# Patient Record
Sex: MALE | Race: BLACK OR AFRICAN AMERICAN | Employment: FULL TIME | URBAN - METROPOLITAN AREA
[De-identification: names, ages, dates, MRNs, and addresses within clinical notes are randomized per-mention and may not be internally consistent; named-entity substitution may affect disease eponyms.]

---
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
## 2019-02-02 ENCOUNTER — APPOINTMENT (OUTPATIENT)
Dept: GENERAL RADIOLOGY | Age: 19
End: 2019-02-02
Payer: COMMERCIAL

## 2019-02-02 ENCOUNTER — HOSPITAL ENCOUNTER (EMERGENCY)
Age: 19
Discharge: HOME | End: 2019-02-02
Attending: EMERGENCY MEDICINE
Payer: COMMERCIAL

## 2019-02-02 VITALS
RESPIRATION RATE: 16 BRPM | SYSTOLIC BLOOD PRESSURE: 124 MMHG | TEMPERATURE: 98.6 F | HEIGHT: 72 IN | HEART RATE: 74 BPM | WEIGHT: 161 LBS | OXYGEN SATURATION: 100 % | DIASTOLIC BLOOD PRESSURE: 77 MMHG | BODY MASS INDEX: 21.81 KG/M2
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
## 2019-02-02 DIAGNOSIS — S40.011A CONTUSION OF RIGHT SCAPULA, INITIAL ENCOUNTER: Primary | ICD-10-CM

## 2019-02-02 PROCEDURE — 99284 EMERGENCY DEPT VISIT MOD MDM: CPT

## 2019-02-02 PROCEDURE — 73010 X-RAY EXAM OF SHOULDER BLADE: CPT

## 2019-02-02 RX ORDER — CYCLOBENZAPRINE HCL 10 MG
10 TABLET ORAL 3 TIMES DAILY PRN
Qty: 9 TABLET | Refills: 0 | Status: SHIPPED | OUTPATIENT
Start: 2019-02-02 | End: 2019-02-05

## 2019-02-02 RX ORDER — NAPROXEN 500 MG/1
500 TABLET ORAL 2 TIMES DAILY WITH MEALS
Qty: 30 TABLET | Refills: 0 | Status: SHIPPED | OUTPATIENT
Start: 2019-02-02

## 2019-02-02 ASSESSMENT — ENCOUNTER SYMPTOMS
NAUSEA: 0
COLOR CHANGE: 0
SHORTNESS OF BREATH: 0
ABDOMINAL PAIN: 0
BACK PAIN: 1
VOMITING: 0

## 2019-02-02 ASSESSMENT — PAIN DESCRIPTION - LOCATION: LOCATION: SHOULDER

## 2019-02-02 ASSESSMENT — PAIN DESCRIPTION - PAIN TYPE: TYPE: ACUTE PAIN

## 2019-02-02 ASSESSMENT — PAIN DESCRIPTION - PROGRESSION: CLINICAL_PROGRESSION: GRADUALLY WORSENING

## 2019-02-02 ASSESSMENT — PAIN DESCRIPTION - ONSET: ONSET: SUDDEN

## 2019-02-02 ASSESSMENT — PAIN DESCRIPTION - DESCRIPTORS: DESCRIPTORS: ACHING;DISCOMFORT

## 2019-02-02 ASSESSMENT — PAIN DESCRIPTION - ORIENTATION: ORIENTATION: RIGHT

## 2019-02-02 ASSESSMENT — PAIN SCALES - GENERAL: PAINLEVEL_OUTOF10: 7

## 2020-07-13 ENCOUNTER — APPOINTMENT (OUTPATIENT)
Dept: GENERAL RADIOLOGY | Age: 20
End: 2020-07-13
Payer: COMMERCIAL

## 2020-07-13 ENCOUNTER — HOSPITAL ENCOUNTER (EMERGENCY)
Age: 20
Discharge: HOME | End: 2020-07-13
Attending: EMERGENCY MEDICINE
Payer: COMMERCIAL

## 2020-07-13 VITALS
RESPIRATION RATE: 14 BRPM | OXYGEN SATURATION: 100 % | SYSTOLIC BLOOD PRESSURE: 130 MMHG | DIASTOLIC BLOOD PRESSURE: 69 MMHG | HEIGHT: 74 IN | WEIGHT: 214 LBS | BODY MASS INDEX: 27.46 KG/M2 | TEMPERATURE: 98.5 F | HEART RATE: 88 BPM

## 2020-07-13 PROCEDURE — 99283 EMERGENCY DEPT VISIT LOW MDM: CPT

## 2020-07-13 PROCEDURE — 73130 X-RAY EXAM OF HAND: CPT

## 2020-07-13 PROCEDURE — 6370000000 HC RX 637 (ALT 250 FOR IP): Performed by: EMERGENCY MEDICINE

## 2020-07-13 PROCEDURE — 12001 RPR S/N/AX/GEN/TRNK 2.5CM/<: CPT

## 2020-07-13 RX ORDER — IBUPROFEN 800 MG/1
800 TABLET ORAL EVERY 8 HOURS PRN
Qty: 20 TABLET | Refills: 0 | Status: SHIPPED | OUTPATIENT
Start: 2020-07-13 | End: 2020-07-23

## 2020-07-13 RX ORDER — HYDROCODONE BITARTRATE AND ACETAMINOPHEN 5; 325 MG/1; MG/1
1 TABLET ORAL EVERY 6 HOURS PRN
Qty: 10 TABLET | Refills: 0 | Status: SHIPPED | OUTPATIENT
Start: 2020-07-13 | End: 2020-07-16

## 2020-07-13 RX ORDER — CEPHALEXIN 500 MG/1
500 CAPSULE ORAL 3 TIMES DAILY
Qty: 15 CAPSULE | Refills: 0 | Status: SHIPPED | OUTPATIENT
Start: 2020-07-13 | End: 2020-07-18

## 2020-07-13 RX ORDER — HYDROCODONE BITARTRATE AND ACETAMINOPHEN 5; 325 MG/1; MG/1
1 TABLET ORAL ONCE
Status: COMPLETED | OUTPATIENT
Start: 2020-07-13 | End: 2020-07-13

## 2020-07-13 RX ORDER — CEPHALEXIN 500 MG/1
500 CAPSULE ORAL ONCE
Status: COMPLETED | OUTPATIENT
Start: 2020-07-13 | End: 2020-07-13

## 2020-07-13 RX ADMIN — HYDROCODONE BITARTRATE AND ACETAMINOPHEN 1 TABLET: 5; 325 TABLET ORAL at 20:19

## 2020-07-13 RX ADMIN — CEPHALEXIN 500 MG: 500 CAPSULE ORAL at 20:19

## 2020-07-13 ASSESSMENT — PAIN SCALES - GENERAL
PAINLEVEL_OUTOF10: 5
PAINLEVEL_OUTOF10: 5

## 2020-07-13 ASSESSMENT — PAIN DESCRIPTION - DESCRIPTORS: DESCRIPTORS: PATIENT UNABLE TO DESCRIBE

## 2020-07-13 ASSESSMENT — PAIN DESCRIPTION - ORIENTATION: ORIENTATION: RIGHT

## 2020-07-13 ASSESSMENT — PAIN DESCRIPTION - PAIN TYPE: TYPE: ACUTE PAIN

## 2020-07-13 ASSESSMENT — PAIN DESCRIPTION - LOCATION: LOCATION: OTHER (COMMENT)

## 2020-07-13 NOTE — ED PROVIDER NOTES
[facility] EMERGENCY DEPT VISIT      Patient Identification  Jose Pereyra is a 23 y.o. male. Chief Complaint   Hand Injury (R thumb lac from wine glass. )      History of Present Illness: This is a  23 y.o. male who presents ambulatory  to the ED with complaints of laceration to right dominant thumb on a broken glass, incident occurred at 1pm today. Last tetanus 2016. Has trouble extending thumb. No loss of sensation. Past Medical History:   Diagnosis Date    Asthma     Neisseria gonorrheae 07/28/2017       History reviewed. No pertinent surgical history. No current facility-administered medications for this encounter. Current Outpatient Medications:     ibuprofen (IBU) 800 MG tablet, Take 1 tablet by mouth every 8 hours as needed for Pain, Disp: 20 tablet, Rfl: 0    HYDROcodone-acetaminophen (NORCO) 5-325 MG per tablet, Take 1 tablet by mouth every 6 hours as needed for Pain for up to 3 days. Intended supply: 3 days.  Take lowest dose possible to manage pain, Disp: 10 tablet, Rfl: 0    cephALEXin (KEFLEX) 500 MG capsule, Take 1 capsule by mouth 3 times daily for 5 days, Disp: 15 capsule, Rfl: 0    naproxen (NAPROSYN) 500 MG tablet, Take 1 tablet by mouth 2 times daily (with meals), Disp: 30 tablet, Rfl: 0    No Known Allergies    Social History     Socioeconomic History    Marital status: Single     Spouse name: Not on file    Number of children: Not on file    Years of education: Not on file    Highest education level: Not on file   Occupational History    Not on file   Social Needs    Financial resource strain: Not on file    Food insecurity     Worry: Not on file     Inability: Not on file    Transportation needs     Medical: Not on file     Non-medical: Not on file   Tobacco Use    Smoking status: Current Every Day Smoker     Packs/day: 1.00     Types: Cigarettes    Smokeless tobacco: Never Used   Substance and Sexual Activity    Alcohol use: No    Drug use: Yes     Frequency: 7.0 times per week     Types: Marijuana    Sexual activity: Yes   Lifestyle    Physical activity     Days per week: Not on file     Minutes per session: Not on file    Stress: Not on file   Relationships    Social connections     Talks on phone: Not on file     Gets together: Not on file     Attends Hoahaoism service: Not on file     Active member of club or organization: Not on file     Attends meetings of clubs or organizations: Not on file     Relationship status: Not on file    Intimate partner violence     Fear of current or ex partner: Not on file     Emotionally abused: Not on file     Physically abused: Not on file     Forced sexual activity: Not on file   Other Topics Concern    Not on file   Social History Narrative    ** Merged History Encounter **            Nursing Notes Reviewed      ROS:  General: no fever  Musculoskeletal: no arthralgia, no myalgia, no back pain, + joint swelling  NEURO: no numbness, no weakness  DERM: no rash, no erythema, no ecchymosis, + wounds      PHYSICAL EXAM:  GENERAL APPEARANCE: Tara Waddell is in no acute respiratory distress. Awake and alert. VITAL SIGNS:   ED Triage Vitals [07/13/20 1836]   Enc Vitals Group      /69      Pulse 87      Resp 20      Temp 98.5 °F (36.9 °C)      Temp Source Oral      SpO2 100 %      Weight 214 lb (97.1 kg)      Height 6' 2\" (1.88 m)      Head Circumference       Peak Flow       Pain Score       Pain Loc       Pain Edu? Excl. in [address]? HEAD: Normocephalic, atraumatic. EYES:  Extraocular muscles are intact. Conjunctivas are pink. Negative scleral icterus. ENT:  Mucous membranes are moist.     NECK: Nontender and supple. CHEST: Clear to auscultation bilaterally. No rales, rhonchi, or wheezing. HEART:  Regular rate and rhythm. No murmurs. Strong and equal pulses in the upper and lower extremities. MUSCULOSKELETAL:  Active range of motion of the upper and lower extremities. No edema.  Laceration dorsal 1st MCP joint measuring 1cm. Decreased strength of extension of thumb at MCP joint ( possible extensor pollicis brevis injury). Extensor strength at IP joint intact. Sensation intact. Pain with flexion at MCP jiont. Abduction and adduction intact. Delwyn Scarce NEUROLOGICAL: Awake, alert and oriented x 3. Power intact in the upper and lower extremities. DERMATOLOGIC: No petechiae, rashes, or ecchymoses. 1cm wound as described      ED COURSE AND MEDICAL DECISION MAKING:      Radiology:  All plain films have been evaluated by myself. They may have been overread by radiologist as noted in chart. Other radiologic studies (i.e. CT, MRI, ultrasounds, etc ) have been interpreted by radiologist.     XR HAND RIGHT (MIN 3 VIEWS)   Final Result      No acute bony pathology are radiopaque foreign body. Treatment in the department:  Patient received   Medications   cephALEXin (KEFLEX) capsule 500 mg (500 mg Oral Given 7/13/20 2019)   HYDROcodone-acetaminophen (NORCO) 5-325 MG per tablet 1 tablet (1 tablet Oral Given 7/13/20 2019)         [facility] or their surrogate had an opportunity to ask questions, and the risks, benefits, and alternatives were discussed. The wound located right thumb was prepped and draped to maintain a sterile field. The wound was anesthetized with 2% lido without epi. It was copiously irrigated. It was explored to its depth in a bloodless field with no sign of tendon, nerve, or vascular injury. No foreign bodies were identified. It was closed with 5 5-0 ethilon sutures. There were no complications during the procedure. Patient was placed in velcro thumb spica spllint to immobilize extensor pollicis tendon    Medical decision making:  Discussed case with dr Donna Styles who will followup in 2-3 days. Patient presents emergency department with a laceration over the right hand/thumb region. There is a question of an extensor pollicis brevis injury versus decreased strength due to laceration of the joint capsule. Bleeding is controlled. The wound is 6 hours old. His tetanus is up-to-date. X-ray showed no evidence of fracture or obvious foreign body. The wound was copiously irrigated. I have discussed the findings of decreased strength of extension at the MCP joint with Dr. Seena Goltz. Ladarius will follow up with him as an outpatient. He is in agreement with primary closure at this time and reassessment in a few days. Patient was immobilized and I explained the importance of close follow-up with hand surgery. I estimate there is LOW risk for CELLULITIS, COMPARTMENT SYNDROME, NECROTIZING FASCIITIS,  NEUROVASCULAR INJURY, or FOREIGN BODY,  DEEP SPACE INFECTION,  thus I consider the discharge disposition reasonable. . Brandon Hugo and I have discussed the diagnosis and risks, and we agree with discharging home to follow-up with their primary doctor. We also discussed returning to the Emergency Department immediately if new or worsening symptoms occur. We have discussed the symptoms which are most concerning (e.g., changing or worsening pain, fever, numbness, weakness, cool or painful digits) that necessitate immediate return      Clinical Impression:  1. Laceration of right hand with tendon involvement including fingers, initial encounter        Dispo:  Patient will be discharged  at this time. Patient was informed of this decision and agrees with plan. I have discussed lab and xray findings with patient and they understand. Questions were answered to the best of my ability. Discharge vitals:  Blood pressure 130/69, pulse 87, temperature 98.5 °F (36.9 °C), temperature source Oral, resp. rate 20, height 6' 2\" (1.88 m), weight 214 lb (97.1 kg), SpO2 100 %. Prescriptions given:   New Prescriptions    CEPHALEXIN (KEFLEX) 500 MG CAPSULE    Take 1 capsule by mouth 3 times daily for 5 days    HYDROCODONE-ACETAMINOPHEN (NORCO) 5-325 MG PER TABLET    Take 1 tablet by mouth every 6 hours as needed for Pain for up to 3 days.  Intended supply: 3 days. Take lowest dose possible to manage pain    IBUPROFEN (IBU) 800 MG TABLET    Take 1 tablet by mouth every 8 hours as needed for Pain         This chart was created using dragon voice recognition software.         Melissa Nichols MD  07/13/20 6002

## 2020-08-21 ENCOUNTER — HOSPITAL ENCOUNTER (EMERGENCY)
Age: 20
Discharge: HOME | End: 2020-08-21
Payer: COMMERCIAL

## 2020-08-21 VITALS
SYSTOLIC BLOOD PRESSURE: 135 MMHG | DIASTOLIC BLOOD PRESSURE: 67 MMHG | WEIGHT: 214 LBS | OXYGEN SATURATION: 97 % | BODY MASS INDEX: 28.36 KG/M2 | HEIGHT: 73 IN | HEART RATE: 57 BPM | RESPIRATION RATE: 16 BRPM | TEMPERATURE: 99.4 F

## 2020-08-21 PROCEDURE — 87491 CHLMYD TRACH DNA AMP PROBE: CPT

## 2020-08-21 PROCEDURE — 99283 EMERGENCY DEPT VISIT LOW MDM: CPT

## 2020-08-21 PROCEDURE — 6370000000 HC RX 637 (ALT 250 FOR IP): Performed by: PHYSICIAN ASSISTANT

## 2020-08-21 PROCEDURE — 87591 N.GONORRHOEAE DNA AMP PROB: CPT

## 2020-08-21 RX ORDER — METRONIDAZOLE 500 MG/1
2000 TABLET ORAL ONCE
Status: COMPLETED | OUTPATIENT
Start: 2020-08-21 | End: 2020-08-21

## 2020-08-21 RX ORDER — ONDANSETRON 4 MG/1
4 TABLET, ORALLY DISINTEGRATING ORAL ONCE
Status: COMPLETED | OUTPATIENT
Start: 2020-08-21 | End: 2020-08-21

## 2020-08-21 RX ADMIN — METRONIDAZOLE 2000 MG: 500 TABLET ORAL at 10:07

## 2020-08-21 RX ADMIN — ONDANSETRON 4 MG: 4 TABLET, ORALLY DISINTEGRATING ORAL at 10:14

## 2020-08-21 ASSESSMENT — ENCOUNTER SYMPTOMS
RESPIRATORY NEGATIVE: 1
COUGH: 0

## 2020-08-21 NOTE — ED NOTES
Pt with x1 episode of emesis no pills in emesis, zofran given. PA made aware.      Taina Gutierrez RN  08/21/20 1013

## 2020-08-21 NOTE — ED PROVIDER NOTES
[facility]          PHYSICIAN ASSISTANT NOTE       Date of evaluation: 8/21/2020    Chief Complaint     Exposure to STD      History of Present Illness     Brandon Arias is a 23 y.o. male who presents for exposure to STD. Patient reports his girlfriend was diagnosed with trichomonas after they had unprotected intercourse. Patient has noticed a small amount of penile discharge in the past couple days. No fever, rash, dysuria, testicular pain. Patient reports girlfriend was tested \"for everything\" and was negative for gonorrhea and chlamydia specifically. Patient presents for treatment of trichomonas. Review of Systems     Review of Systems   Constitutional: Negative. Negative for fever. Respiratory: Negative. Negative for cough. Cardiovascular: Negative. Negative for chest pain. Genitourinary: Positive for discharge. Negative for dysuria, penile pain and testicular pain. Skin: Negative. Negative for rash. Neurological: Negative. All other systems reviewed and are negative. Past Medical, Surgical, Family, and Social History     He has a past medical history of Asthma and Neisseria gonorrheae. He has no past surgical history on file. His family history is not on file. He reports that he has been smoking cigarettes. He has been smoking about 1.00 pack per day. He has never used smokeless tobacco. He reports current drug use. Frequency: 7.00 times per week. Drug: Marijuana. He reports that he does not drink alcohol. Medications     Previous Medications    IBUPROFEN (IBU) 800 MG TABLET    Take 1 tablet by mouth every 8 hours as needed for Pain    NAPROXEN (NAPROSYN) 500 MG TABLET    Take 1 tablet by mouth 2 times daily (with meals)       Allergies     He has No Known Allergies. Physical Exam     INITIAL VITALS: BP: 135/67, Temp: 99.4 °F (37.4 °C), Pulse: 57, Resp: 16, SpO2: 97 %  Physical Exam  Vitals signs and nursing note reviewed. - Pending Orders Complete 08/21/2020 09:38:20 AM        Oskar Joshua PA-C  08/21/20 6154

## 2020-08-23 ENCOUNTER — HOSPITAL ENCOUNTER (EMERGENCY)
Age: 20
Discharge: HOME | End: 2020-08-23
Attending: STUDENT IN AN ORGANIZED HEALTH CARE EDUCATION/TRAINING PROGRAM
Payer: COMMERCIAL

## 2020-08-23 VITALS
BODY MASS INDEX: 28.49 KG/M2 | HEIGHT: 73 IN | HEART RATE: 66 BPM | WEIGHT: 215 LBS | RESPIRATION RATE: 18 BRPM | TEMPERATURE: 98.5 F | DIASTOLIC BLOOD PRESSURE: 72 MMHG | OXYGEN SATURATION: 100 % | SYSTOLIC BLOOD PRESSURE: 127 MMHG

## 2020-08-23 PROCEDURE — 99283 EMERGENCY DEPT VISIT LOW MDM: CPT

## 2020-08-23 PROCEDURE — 6370000000 HC RX 637 (ALT 250 FOR IP): Performed by: STUDENT IN AN ORGANIZED HEALTH CARE EDUCATION/TRAINING PROGRAM

## 2020-08-23 PROCEDURE — 2500000003 HC RX 250 WO HCPCS

## 2020-08-23 PROCEDURE — 96372 THER/PROPH/DIAG INJ SC/IM: CPT

## 2020-08-23 PROCEDURE — 6360000002 HC RX W HCPCS: Performed by: STUDENT IN AN ORGANIZED HEALTH CARE EDUCATION/TRAINING PROGRAM

## 2020-08-23 RX ORDER — CEFTRIAXONE SODIUM 250 MG/1
250 INJECTION, POWDER, FOR SOLUTION INTRAMUSCULAR; INTRAVENOUS ONCE
Status: COMPLETED | OUTPATIENT
Start: 2020-08-23 | End: 2020-08-23

## 2020-08-23 RX ORDER — LIDOCAINE HYDROCHLORIDE 10 MG/ML
INJECTION, SOLUTION EPIDURAL; INFILTRATION; INTRACAUDAL; PERINEURAL
Status: COMPLETED
Start: 2020-08-23 | End: 2020-08-23

## 2020-08-23 RX ORDER — METRONIDAZOLE 500 MG/1
2000 TABLET ORAL ONCE
Status: COMPLETED | OUTPATIENT
Start: 2020-08-23 | End: 2020-08-23

## 2020-08-23 RX ORDER — AZITHROMYCIN 250 MG/1
1000 TABLET, FILM COATED ORAL ONCE
Status: COMPLETED | OUTPATIENT
Start: 2020-08-23 | End: 2020-08-23

## 2020-08-23 RX ADMIN — AZITHROMYCIN 1000 MG: 250 TABLET, FILM COATED ORAL at 11:36

## 2020-08-23 RX ADMIN — CEFTRIAXONE SODIUM 250 MG: 250 INJECTION, POWDER, FOR SOLUTION INTRAMUSCULAR; INTRAVENOUS at 11:36

## 2020-08-23 RX ADMIN — METRONIDAZOLE 2000 MG: 500 TABLET ORAL at 11:37

## 2020-08-23 RX ADMIN — LIDOCAINE HYDROCHLORIDE 1 ML: 10 INJECTION, SOLUTION EPIDURAL; INFILTRATION; INTRACAUDAL; PERINEURAL at 11:37

## 2020-08-23 ASSESSMENT — ENCOUNTER SYMPTOMS
TROUBLE SWALLOWING: 0
BACK PAIN: 0
VOMITING: 0
ABDOMINAL PAIN: 0
EYE PAIN: 0
EYE REDNESS: 0
NAUSEA: 0
DIARRHEA: 0
COUGH: 0
SORE THROAT: 0
SHORTNESS OF BREATH: 0

## 2020-08-23 NOTE — ED PROVIDER NOTES
Capillary Refill: Capillary refill takes less than 2 seconds. Neurological:      General: No focal deficit present. Mental Status: He is alert. Mental status is at baseline. Psychiatric:         Mood and Affect: Mood normal.         Diagnostic Results     RADIOLOGY:  No orders to display       LABS:   No results found for this visit on 08/23/20. RECENT VITALS:  BP: 127/72,Temp: 98.5 °F (36.9 °C), Pulse: 66, Resp: 18, SpO2: 100 %     Procedures     ED Course     Nursing Notes, Past Medical Hx, Past Surgical Hx, Social Hx,Allergies, and Family Hx were reviewed. patient was given the following medications:  Orders Placed This Encounter   Medications    cefTRIAXone (ROCEPHIN) injection 250 mg    azithromycin (ZITHROMAX) tablet 1,000 mg    metroNIDAZOLE (FLAGYL) tablet 2,000 mg       CONSULTS:  None    MEDICAL DECISIONMAKING / ASSESSMENT / Brain Santos is a 23 y.o. male who presents for treatment of STD. We will plan to provide ceftriaxone, azithromycin, and metronidazole while patient is in the emergency department. His chlamydia and gonorrhea testing from 2 days ago is still pending however given exposure we will plan to treat. While in the emergency department 2 days ago patient received treatment for trichomonas with metronidazole which we will repeat today given possibility for continued exposure. Discussed with patient obtaining HIV/AIDS testing which he declined, also declined urinalysis. Patient advised return the emergency department immediately if he experiences pain with urinating, fever, or any other concerning change in his symptoms. Was advised to follow-up with his primary care physician for repeat evaluation and possible HIV testing. Patient agrees with plan, all questions answered. Clinical Impression     1.  Contact with and (suspected) exposure to infections with a predominantly sexual mode of transmission        Disposition     PATIENT REFERRED TO:  [facility] Emergency Department  [address] [facility] [address]  [phone]  Go to   If symptoms worsen      DISCHARGE MEDICATIONS:  New Prescriptions    No medications on file       DISPOSITION Discharge - Pending Orders Complete 08/23/2020 11:24:24 AM       Tessa El MD  08/23/20 1126

## 2020-08-23 NOTE — ED NOTES
Patient prepared for and ready to be discharged. Patient discharged at this time in no acute distress after verbalizing understanding of discharge instructions. Patient left after receiving After Visit Summary instructions.       Viviana Hayes RN  08/23/20 5726

## 2020-08-25 LAB
C. TRACHOMATIS DNA ,URINE: POSITIVE
N. GONORRHOEAE DNA, URINE: POSITIVE

## 2020-10-06 ENCOUNTER — OFFICE VISIT (OUTPATIENT)
Dept: PRIMARY CARE CLINIC | Age: 20
End: 2020-10-06
Payer: COMMERCIAL

## 2020-10-06 PROCEDURE — 99211 OFF/OP EST MAY X REQ PHY/QHP: CPT | Performed by: NURSE PRACTITIONER

## 2020-10-06 PROCEDURE — G8419 CALC BMI OUT NRM PARAM NOF/U: HCPCS | Performed by: NURSE PRACTITIONER

## 2020-10-06 PROCEDURE — G8428 CUR MEDS NOT DOCUMENT: HCPCS | Performed by: NURSE PRACTITIONER

## 2020-10-06 NOTE — PROGRESS NOTES
Nichelle Rizo received a viral test for COVID-19. They were educated on isolation and quarantine as appropriate. For any symptoms, they were directed to seek care from their PCP, given contact information to establish with a doctor, directed to an urgent care or the emergency room.

## 2020-10-08 LAB — SARS-COV-2, NAA: NOT DETECTED

## 2020-10-08 NOTE — RESULT ENCOUNTER NOTE
Your Covid-10 teste resulted not detected/negative. What happens if I have a negative test?    Remember to wash your hands often, avoid touching your face, stay 6 feet from people you do not live with, and wear a cloth facemask when you go out in public. A negative COVID-19 test at one point in time does not mean you will stay negative. You could become ill with COVID-19 and/or test positive at any time. If you are a close contact of a confirmed or suspected case, continue to stay home and away from others until 14 days after your last exposure. If you do not have symptoms, and were not in close contact with a confirmed or suspected case, you can stop isolating. If you currently have symptoms of COVID-19, and were not in close contact with a confirmed or suspected case, you should keep monitoring symptoms and talk to your doctor or other healthcare provider about staying home and if you need to get tested again. If you develop symptoms of COVID-19, stay at home and away from others and talk to your doctor or other healthcare provider about getting tested again. For additional information, visit coronavirus. [place].gov. For answers to your COVID-19 questions, call 3-052-7-ASK-[facility] (8-935.335.8065).

## 2021-03-31 ENCOUNTER — HOSPITAL ENCOUNTER (EMERGENCY)
Age: 21
Discharge: HOME | End: 2021-03-31
Attending: EMERGENCY MEDICINE
Payer: COMMERCIAL

## 2021-03-31 VITALS
HEART RATE: 72 BPM | HEIGHT: 73 IN | SYSTOLIC BLOOD PRESSURE: 108 MMHG | TEMPERATURE: 98.6 F | DIASTOLIC BLOOD PRESSURE: 70 MMHG | OXYGEN SATURATION: 96 % | BODY MASS INDEX: 27.83 KG/M2 | RESPIRATION RATE: 18 BRPM | WEIGHT: 210 LBS

## 2021-03-31 DIAGNOSIS — N34.2 URETHRITIS: Primary | ICD-10-CM

## 2021-03-31 PROCEDURE — 2500000003 HC RX 250 WO HCPCS: Performed by: EMERGENCY MEDICINE

## 2021-03-31 PROCEDURE — 87591 N.GONORRHOEAE DNA AMP PROB: CPT

## 2021-03-31 PROCEDURE — 96372 THER/PROPH/DIAG INJ SC/IM: CPT

## 2021-03-31 PROCEDURE — 99283 EMERGENCY DEPT VISIT LOW MDM: CPT

## 2021-03-31 PROCEDURE — 6360000002 HC RX W HCPCS: Performed by: EMERGENCY MEDICINE

## 2021-03-31 PROCEDURE — 87491 CHLMYD TRACH DNA AMP PROBE: CPT

## 2021-03-31 RX ORDER — DOXYCYCLINE HYCLATE 100 MG
100 TABLET ORAL 2 TIMES DAILY
Qty: 14 TABLET | Refills: 0 | Status: SHIPPED | OUTPATIENT
Start: 2021-03-31 | End: 2021-04-07

## 2021-03-31 RX ADMIN — LIDOCAINE HYDROCHLORIDE 500 MG: 10 INJECTION, SOLUTION EPIDURAL; INFILTRATION; INTRACAUDAL; PERINEURAL at 12:05

## 2021-03-31 NOTE — ED PROVIDER NOTES
Nursing Notes, Past Medical Hx, Past Surgical Hx, Social Hx, Allergies, and Family Hx were reviewed. The patient was given the following medications:  Orders Placed This Encounter   Medications    doxycycline hyclate (VIBRA-TABS) 100 MG tablet     Sig: Take 1 tablet by mouth 2 times daily for 7 days     Dispense:  14 tablet     Refill:  0    DISCONTD: cefTRIAXone (ROCEPHIN) 500 mg in lidocaine 1 % 2 mL IM Injection     Order Specific Question:   Antimicrobial Indications     Answer:   STD infection    cefTRIAXone (ROCEPHIN) 500 mg in lidocaine 1 % 1 mL IM Injection     Order Specific Question:   Antimicrobial Indications     Answer:   STD infection       MEDICAL DECISION MAKING / ASSESSMENT / PLAN     GC and chlamydia testing was done. This patient presents with urethral discharge and symptoms of urethritis. Given the overall benign exam, the patient will be treated with antibiotics based on current CDC recommendations. There is no testicular pain or tenderness to suggest orchitis or epididymitis. He is advised to use the health department in the future for this type of problem. Clinical Impression     1.  Urethritis        Disposition     PATIENT REFERRED TO:  [place] [address]  [phone]      for HIV and Syphillis testing      DISCHARGE MEDICATIONS:  New Prescriptions    DOXYCYCLINE HYCLATE (VIBRA-TABS) 100 MG TABLET    Take 1 tablet by mouth 2 times daily for 7 days       DISPOSITION DISPOSITION Discharge - Pending Orders Complete 03/31/2021 11:46:56 AM        Fang Parry MD  03/31/21 9144

## 2021-04-01 LAB
C. TRACHOMATIS DNA ,URINE: NEGATIVE
N. GONORRHOEAE DNA, URINE: NEGATIVE

## 2021-05-03 ENCOUNTER — HOSPITAL ENCOUNTER (EMERGENCY)
Age: 21
Discharge: HOME | End: 2021-05-03
Attending: EMERGENCY MEDICINE
Payer: COMMERCIAL

## 2021-05-03 VITALS
BODY MASS INDEX: 26.51 KG/M2 | SYSTOLIC BLOOD PRESSURE: 126 MMHG | WEIGHT: 200 LBS | HEART RATE: 88 BPM | OXYGEN SATURATION: 96 % | DIASTOLIC BLOOD PRESSURE: 69 MMHG | TEMPERATURE: 98.7 F | HEIGHT: 73 IN | RESPIRATION RATE: 18 BRPM

## 2021-05-03 DIAGNOSIS — M79.18 BUTTOCK PAIN: Primary | ICD-10-CM

## 2021-05-03 PROCEDURE — 6370000000 HC RX 637 (ALT 250 FOR IP): Performed by: STUDENT IN AN ORGANIZED HEALTH CARE EDUCATION/TRAINING PROGRAM

## 2021-05-03 PROCEDURE — 99284 EMERGENCY DEPT VISIT MOD MDM: CPT

## 2021-05-03 RX ORDER — SULFAMETHOXAZOLE AND TRIMETHOPRIM 800; 160 MG/1; MG/1
1 TABLET ORAL 2 TIMES DAILY
Qty: 14 TABLET | Refills: 0 | Status: SHIPPED | OUTPATIENT
Start: 2021-05-03 | End: 2021-05-10

## 2021-05-03 RX ORDER — IBUPROFEN 400 MG/1
800 TABLET ORAL ONCE
Status: COMPLETED | OUTPATIENT
Start: 2021-05-03 | End: 2021-05-03

## 2021-05-03 RX ORDER — CEPHALEXIN 500 MG/1
500 CAPSULE ORAL 4 TIMES DAILY
Qty: 28 CAPSULE | Refills: 0 | Status: SHIPPED | OUTPATIENT
Start: 2021-05-03 | End: 2021-05-10

## 2021-05-03 RX ORDER — ACETAMINOPHEN 325 MG/1
650 TABLET ORAL ONCE
Status: COMPLETED | OUTPATIENT
Start: 2021-05-03 | End: 2021-05-03

## 2021-05-03 RX ADMIN — ACETAMINOPHEN 650 MG: 325 TABLET ORAL at 02:43

## 2021-05-03 RX ADMIN — IBUPROFEN 800 MG: 400 TABLET, FILM COATED ORAL at 02:43

## 2021-05-03 ASSESSMENT — PAIN DESCRIPTION - DESCRIPTORS
DESCRIPTORS: CONSTANT;THROBBING
DESCRIPTORS: CONSTANT;THROBBING

## 2021-05-03 ASSESSMENT — ENCOUNTER SYMPTOMS
DIARRHEA: 0
ABDOMINAL PAIN: 0
BLOOD IN STOOL: 0
CONSTIPATION: 0
VOMITING: 0
NAUSEA: 0

## 2021-05-03 ASSESSMENT — PAIN SCALES - GENERAL
PAINLEVEL_OUTOF10: 8
PAINLEVEL_OUTOF10: 8

## 2021-05-03 ASSESSMENT — PAIN DESCRIPTION - PAIN TYPE: TYPE: ACUTE PAIN

## 2021-05-03 NOTE — ED TRIAGE NOTES
Pt comes to ED with pain/inflammation to the top area of buttock. Pt states this started a few days ago.

## 2021-05-03 NOTE — ED NOTES
Pt being discharged. Went over all discharge instructions, pt acknowledged understanding, and all questions answered. Pt able to ambulate out of ED.       Colby Tanner RN  05/03/21 7643

## 2021-05-03 NOTE — LETTER
[facility] Emergency Department  [facility] [address] [address]  Phone: [phone]  Fax: [phone]    No name on file. May 3, 2021     Patient: Issa Colin   YOB: 2000   Date of Visit: 5/3/2021       To Whom It May Concern: It is my medical opinion that Issa Colin may return to work on 05/05/2021. If you have any questions or concerns, please don't hesitate to call. Sincerely,        No name on file.

## 2023-03-10 NOTE — ED PROVIDER NOTES
Exam  Constitutional:       General: He is not in acute distress. Appearance: He is well-developed. He is not diaphoretic. HENT:      Head: Normocephalic and atraumatic. Eyes:      Pupils: Pupils are equal, round, and reactive to light. Neck:      Musculoskeletal: Neck supple. Cardiovascular:      Rate and Rhythm: Normal rate and regular rhythm. Heart sounds: Normal heart sounds. Pulmonary:      Effort: Pulmonary effort is normal. No respiratory distress. Breath sounds: Normal breath sounds. No stridor. No wheezing or rales. Abdominal:      General: There is no distension. Palpations: Abdomen is soft. Tenderness: There is no abdominal tenderness. There is no guarding or rebound. Skin:     General: Skin is warm and dry. Findings: No rash. Neurological:      Mental Status: He is alert and oriented to person, place, and time. DiagnosticResults     EKG   na    RADIOLOGY:  No orders to display       LABS:   No results found for this visit on 05/03/21. ED BEDSIDE ULTRASOUND:  Soft tissue ultrasound did not show findings consistent with abscess or fluid collection. Done at proximal intergluteal cleft. RECENT VITALS:  BP: 126/69, Temp: 98.7 °F (37.1 °C), Pulse: 88,Resp: 18, SpO2: 96 %     Procedures   na    ED Course     Nursing Notes, Past Medical Hx, Past Surgical Hx, Social Hx, Allergies, and Family Hx were reviewed.     The patient was given the followingmedications:  Orders Placed This Encounter   Medications    ibuprofen (ADVIL;MOTRIN) tablet 800 mg    acetaminophen (TYLENOL) tablet 650 mg    cephALEXin (KEFLEX) 500 MG capsule     Sig: Take 1 capsule by mouth 4 times daily for 7 days     Dispense:  28 capsule     Refill:  0    sulfamethoxazole-trimethoprim (BACTRIM DS) 800-160 MG per tablet     Sig: Take 1 tablet by mouth 2 times daily for 7 days     Dispense:  14 tablet     Refill:  0       CONSULTS:  None    MEDICAL DECISION Yolanda Oleary / Jenny Barr / Vincent Miller Alejandrina Bhat is a 24 y.o. male presents with pain at the superior aspect of his intergluteal cleft. This area is a common area for pilonidal cyst.  On exam, there is mild induration palpable without any skin changes. There is no fluctuance. I used the ultrasound I cannot appreciate any fluid collection suggesting a cyst or abscess. The symptoms are very consistent and he has had chills. I would treat this empirically for developing pilonidal abscess. Given Tylenol ibuprofen here and can continue this at home. Will treat with Bactrim and Keflex for likely developing infection. He was asked to follow-up with PCP given that these can recur. If it recurs multiple times, I discussed that they may need to see a surgeon. Stable for discharge. This patient was also evaluated by the attending physician. All care plans werediscussed and agreed upon. Clinical Impression     1. Buttock pain        Disposition     PATIENT REFERRED TO:  No follow-up provider specified.     DISCHARGE MEDICATIONS:  New Prescriptions    CEPHALEXIN (KEFLEX) 500 MG CAPSULE    Take 1 capsule by mouth 4 times daily for 7 days    SULFAMETHOXAZOLE-TRIMETHOPRIM (BACTRIM DS) 800-160 MG PER TABLET    Take 1 tablet by mouth 2 times daily for 7 days       DISPOSITION Decision To Discharge 05/03/2021 03:04:48 AM     Buddy Concepcion MD  Resident  05/03/21 7151 No